# Patient Record
Sex: FEMALE | RURAL
[De-identification: names, ages, dates, MRNs, and addresses within clinical notes are randomized per-mention and may not be internally consistent; named-entity substitution may affect disease eponyms.]

---

## 2021-07-20 ENCOUNTER — HOSPITAL ENCOUNTER (OUTPATIENT)
Facility: HOSPITAL | Age: 86
Discharge: HOME | End: 2021-07-20
Payer: MEDICARE

## 2021-08-05 ENCOUNTER — HOSPITAL ENCOUNTER (OUTPATIENT)
Facility: HOSPITAL | Age: 86
Discharge: HOME | End: 2021-08-05
Payer: MEDICARE

## 2021-08-05 LAB
HCT VFR BLD CALC: 30.7 % (ref 37–47)
HEMOGLOBIN: 10 G/DL (ref 11.5–16.5)
MCH RBC QN AUTO: 32.1 PG (ref 27–32)
MCHC RBC AUTO-ENTMCNC: 32.6 G/DL (ref 31–35)
MCV RBC AUTO: 98.4 FL (ref 80–100)
PDW BLD-RTO: 14.8 % (ref 11–16)
PLATELET # BLD: 211 K/UL (ref 150–400)
PMV BLD AUTO: 10.3 FL (ref 6–10)
RBC # BLD: 3.12 M/UL (ref 3.8–5.8)
WBC # BLD: 7.3 K/UL (ref 4–11)

## 2021-08-05 PROCEDURE — 36415 COLL VENOUS BLD VENIPUNCTURE: CPT

## 2021-08-05 PROCEDURE — 85027 COMPLETE CBC AUTOMATED: CPT

## 2023-04-04 ENCOUNTER — OFFICE VISIT (OUTPATIENT)
Dept: CARDIOLOGY | Facility: CLINIC | Age: 88
End: 2023-04-04
Payer: MEDICARE

## 2023-04-04 VITALS
DIASTOLIC BLOOD PRESSURE: 72 MMHG | SYSTOLIC BLOOD PRESSURE: 138 MMHG | OXYGEN SATURATION: 94 % | WEIGHT: 203 LBS | HEART RATE: 84 BPM | BODY MASS INDEX: 32.62 KG/M2 | HEIGHT: 66 IN

## 2023-04-04 DIAGNOSIS — I10 PRIMARY HYPERTENSION: ICD-10-CM

## 2023-04-04 DIAGNOSIS — G47.36 HYPOXEMIA ASSOCIATED WITH SLEEP: ICD-10-CM

## 2023-04-04 DIAGNOSIS — G47.33 OSA (OBSTRUCTIVE SLEEP APNEA): Primary | ICD-10-CM

## 2023-04-04 PROCEDURE — 1159F MED LIST DOCD IN RCRD: CPT | Performed by: NURSE PRACTITIONER

## 2023-04-04 PROCEDURE — 99214 OFFICE O/P EST MOD 30 MIN: CPT | Performed by: NURSE PRACTITIONER

## 2023-04-04 PROCEDURE — 1160F RVW MEDS BY RX/DR IN RCRD: CPT | Performed by: NURSE PRACTITIONER

## 2023-04-04 RX ORDER — NITROGLYCERIN 0.4 MG/1
TABLET SUBLINGUAL
COMMUNITY

## 2023-04-04 RX ORDER — LEVOTHYROXINE SODIUM 88 UG/1
1 TABLET ORAL DAILY
COMMUNITY
Start: 2023-03-06

## 2023-04-04 RX ORDER — AMIODARONE HYDROCHLORIDE 200 MG/1
0.5 TABLET ORAL DAILY
COMMUNITY

## 2023-04-04 RX ORDER — FLUOXETINE HYDROCHLORIDE 40 MG/1
1 CAPSULE ORAL DAILY
COMMUNITY

## 2023-04-04 RX ORDER — GABAPENTIN 300 MG/1
1 CAPSULE ORAL DAILY
COMMUNITY

## 2023-04-04 RX ORDER — OMEPRAZOLE 40 MG/1
1 CAPSULE, DELAYED RELEASE ORAL DAILY
COMMUNITY

## 2023-04-04 RX ORDER — ALLOPURINOL 300 MG/1
1 TABLET ORAL DAILY
COMMUNITY

## 2023-04-04 RX ORDER — PILOCARPINE HYDROCHLORIDE 5 MG/1
1 TABLET, FILM COATED ORAL 2 TIMES DAILY
COMMUNITY

## 2023-04-04 RX ORDER — IRBESARTAN 300 MG/1
1 TABLET ORAL DAILY
COMMUNITY
Start: 2023-04-03

## 2023-04-04 RX ORDER — BUMETANIDE 2 MG/1
1 TABLET ORAL 2 TIMES DAILY
COMMUNITY

## 2023-04-04 NOTE — ASSESSMENT & PLAN NOTE
Baseline AHI is 33.  This is severe sleep apnea.  She is on CPAP therapy.  Download is reviewed with good control and good compliance.  She had a long hospitalization and rehabilitation stay in February and March 2023.  She did not use her home CPAP during this period of time.  She is benefiting from PAP therapy and we plan to continue PAP therapy.    Supply order to the DME of her choice.  Long discussion on adjusting the humidifier and adjusting the tube temperature for comfort due to dry mouth.  Long discussion on mask choices she is currently in an air fit F30 with some mild skin irritation on her cheeks.  Encouraged to use a face shield with her mask if possible.  She may consider a memory foam as this may benefit her skin irritation from the mask.  We did offer her a nasal mask and a chinstrap and this was discussed.  She is a mouth breather.  She prefers to maintain her fullface mask.

## 2023-04-04 NOTE — ASSESSMENT & PLAN NOTE
Pressure 138/72.  This is well controlled.  Untreated sleep apnea may potentiate hypertension.  She is encouraged to continue her PAP therapy.

## 2023-04-04 NOTE — PROGRESS NOTES
She is    Follow-Up Sleep Consult     Date:   2023  Name: Princess Ramos  :   1932  PCP: Monster Burden PA    Chief Complaint   Patient presents with   • Sleep Apnea       Subjective     History of Present Illness  Princess Ramos is a 89 y.o. female who presents today for follow-up on JESUS MANUEL.  She has a known history of severe sleep apnea.  Accompanied to the office today with her son via wheelchair assistance.  Ms. Ramos is very delightful and carries on conversation easily.  She reports she had a long hospital stay and a rehabilitation stay in the month of February and March due to sepsis.  She reports that she is recovering from this well.  She reports that her mask has some air leak and is a little difficult to seat and seal at times.  She reports that she has some skin irritation due to the mask but her family physician has given her cream which does help with this.  She reports that her airflow is very comfortable.  She reports that her device is functioning well.  Her and her son reports that she received her supplies from the DME and they are very helpful and that in fact her DME has been out to her house to help her adjust her humidifier due to her complaints of dry mouth.  She has dry mouth in the daytime and at the nighttime.  She reports that she is a mouth breather in the daytime and at nighttime.    She has coexisting atrial fibrillation, hypertension.    History of JESUS MANUEL with a baseline AHI of 33 on a study 2014.  Her last titration study was 2014.      Current Treatment: CPAP 9 to 14 cm plus O2 at 2 L nightly.      Device Download  AHI on download is 3.3.  Compliance on download is 93%.  When used >4 hours is 60%  Average use per night is 6 hours 6 minutes.    Current mask used is fullface mask that is an air fit F30.    The patient's relevant past medical, surgical, family, and social history reviewed and updated in Epic as appropriate.    Past Medical History:    Diagnosis Date   • Anemia    • Atrial fibrillation    • Breast cancer    • Cataracts, bilateral    • DVT (deep venous thrombosis)     BLOOD CLOTS IN LEGS   • Gout    • High blood pressure    • Obstructive sleep apnea (adult) (pediatric)    • PAF (paroxysmal atrial fibrillation)    • RLS (restless legs syndrome)    • Sleep related hypoventilation in conditions classified elsewhere    • Stroke (cerebrum)      Past Surgical History:   Procedure Laterality Date   • TOTAL HIP ARTHROPLASTY  2010     OB History    No obstetric history on file.       No Known Allergies  Prior to Admission medications    Medication Sig Start Date End Date Taking? Authorizing Provider   allopurinol (ZYLOPRIM) 300 MG tablet Take 1 tablet by mouth Daily.   Yes Monty Harrison MD   amiodarone (PACERONE) 200 MG tablet Take 0.5 tablets by mouth Daily.   Yes Monty Harrison MD   bumetanide (BUMEX) 2 MG tablet Take 1 tablet by mouth 2 (Two) Times a Day.   Yes Monty Harrison MD   FLUoxetine (PROzac) 40 MG capsule Take 1 capsule by mouth Daily.   Yes Monty Harrison MD   gabapentin (NEURONTIN) 300 MG capsule Take 1 capsule by mouth Daily.   Yes Monty Harrison MD   irbesartan (AVAPRO) 300 MG tablet Take 1 tablet by mouth Daily. 4/3/23  Yes Monty Harrison MD   levothyroxine (SYNTHROID, LEVOTHROID) 88 MCG tablet Take 1 tablet by mouth Daily. 3/6/23  Yes Monty Harrison MD   nitroglycerin (NITROSTAT) 0.4 MG SL tablet nitroglycerin 0.4 mg sublingual tablet   DISSOLVE 1 TABLET UNDER THE TONGUE EVERY 5 MINUTES AS NEEDED FOR CHEST PAIN. DO NOT EXCEED A TOTAL OF 3 DOSES IN 15 MINUTES.   Yes ProviderMonty MD   omeprazole (priLOSEC) 40 MG capsule Take 1 capsule by mouth Daily.   Yes ProviderMonty MD   pilocarpine (SALAGEN) 5 MG tablet Take 1 tablet by mouth 2 (Two) Times a Day.   Yes Monty Harrison MD   rivaroxaban (XARELTO) 15 MG tablet Take 1 tablet by mouth Daily.   Yes Hunter  "MD Monty   vitamin D3 125 MCG (5000 UT) capsule capsule Take 1 capsule by mouth Daily. 4/1/23  Yes Provider, MD Monty     Family History   Problem Relation Age of Onset   • Clotting disorder Mother    • Other Father         WORK ACCIDENT   • Alzheimer's disease Sister    • Heart disease Brother    • No Known Problems Brother    • Other Brother         SEPSIS       Objective     Vital Signs:  /72 (BP Location: Left arm, Patient Position: Sitting)   Pulse 84   Ht 167.6 cm (66\")   Wt 92.1 kg (203 lb)   SpO2 94%   BMI 32.77 kg/m²     BMI is >= 30 and <35. (Class 1 Obesity). The following options were offered after discussion;: referral to primary care        Physical Exam  Constitutional:       Appearance: Normal appearance.   HENT:      Head: Normocephalic.      Nose: Nose normal.      Mouth/Throat:      Mouth: Mucous membranes are dry.   Pulmonary:      Effort: Pulmonary effort is normal.   Musculoskeletal:         General: Normal range of motion.      Cervical back: Neck supple.   Neurological:      Mental Status: She is alert and oriented to person, place, and time.   Psychiatric:         Mood and Affect: Mood normal.         Behavior: Behavior normal.         Thought Content: Thought content normal.         The following data was reviewed by: JOSEFINA Villarreal on 04/04/2023:    PAP download reviewed: CPAP download dated January 2 through January 31, 2023.  This is a 30-day download.  Download is reviewed and interpreted in the clinic today.         Assessment and Plan     Diagnoses and all orders for this visit:    1. JESUS MANUEL (obstructive sleep apnea) (Primary)  Assessment & Plan:  Baseline AHI is 33.  This is severe sleep apnea.  She is on CPAP therapy.  Download is reviewed with good control and good compliance.  She had a long hospitalization and rehabilitation stay in February and March 2023.  She did not use her home CPAP during this period of time.  She is benefiting from PAP " therapy and we plan to continue PAP therapy.    Supply order to the DME of her choice.  Long discussion on adjusting the humidifier and adjusting the tube temperature for comfort due to dry mouth.  Long discussion on mask choices she is currently in an air fit F30 with some mild skin irritation on her cheeks.  Encouraged to use a face shield with her mask if possible.  She may consider a memory foam as this may benefit her skin irritation from the mask.  We did offer her a nasal mask and a chinstrap and this was discussed.  She is a mouth breather.  She prefers to maintain her fullface mask.    Orders:  -     PAP Therapy    2. Hypoxemia associated with sleep  Assessment & Plan:  She has O2 at 2 L connected to her CPAP device.      3. Primary hypertension  Assessment & Plan:  Pressure 138/72.  This is well controlled.  Untreated sleep apnea may potentiate hypertension.  She is encouraged to continue her PAP therapy.          Report if any new/changing symptoms immediately and Increase pap therapy usage         Follow Up  Return in about 1 year (around 4/4/2024) for JESUS MANUEL.  Patient was given instructions and counseling regarding her condition or for health maintenance advice. Please see specific information pulled into the AVS if appropriate.

## 2023-08-25 DIAGNOSIS — G89.4 CHRONIC PAIN SYNDROME: Primary | ICD-10-CM

## 2023-08-25 PROCEDURE — 99305 1ST NF CARE MODERATE MDM 35: CPT | Performed by: FAMILY MEDICINE

## 2023-08-25 RX ORDER — GABAPENTIN 300 MG/1
300 CAPSULE ORAL NIGHTLY
Qty: 30 CAPSULE | Refills: 1 | Status: SHIPPED | OUTPATIENT
Start: 2023-08-25 | End: 2023-10-24

## 2023-08-28 DIAGNOSIS — G89.4 CHRONIC PAIN SYNDROME: ICD-10-CM

## 2023-08-28 RX ORDER — GABAPENTIN 300 MG/1
300 CAPSULE ORAL 2 TIMES DAILY
Qty: 60 CAPSULE | Refills: 0 | Status: SHIPPED | OUTPATIENT
Start: 2023-08-28 | End: 2023-09-27

## 2023-08-29 DIAGNOSIS — F41.9 ANXIETY: Primary | ICD-10-CM

## 2023-08-29 RX ORDER — LORAZEPAM 0.5 MG/1
0.5 TABLET ORAL EVERY 8 HOURS PRN
Qty: 90 TABLET | Refills: 0 | Status: SHIPPED | OUTPATIENT
Start: 2023-08-29 | End: 2023-09-28

## 2023-09-11 ENCOUNTER — OUTSIDE SERVICES (OUTPATIENT)
Dept: FAMILY MEDICINE CLINIC | Age: 88
End: 2023-09-11
Payer: MEDICARE

## 2023-09-11 DIAGNOSIS — L03.116 CELLULITIS OF LEFT LOWER EXTREMITY: ICD-10-CM

## 2023-09-11 DIAGNOSIS — N28.9 ACUTE RENAL INSUFFICIENCY: ICD-10-CM

## 2023-09-11 DIAGNOSIS — D72.829 LEUKOCYTOSIS, UNSPECIFIED TYPE: Primary | ICD-10-CM

## 2023-09-11 DIAGNOSIS — D64.9 ANEMIA, UNSPECIFIED TYPE: ICD-10-CM

## 2023-09-11 DIAGNOSIS — J81.0 ACUTE PULMONARY EDEMA (HCC): ICD-10-CM

## 2023-09-11 DIAGNOSIS — I48.0 PAROXYSMAL ATRIAL FIBRILLATION (HCC): ICD-10-CM

## 2023-09-11 ASSESSMENT — ENCOUNTER SYMPTOMS
BACK PAIN: 1
SHORTNESS OF BREATH: 1

## 2023-09-11 NOTE — PROGRESS NOTES
1. Leukocytosis, unspecified type        2. Acute renal insufficiency        3. Anemia, unspecified type        4. Paroxysmal atrial fibrillation (HCC)        5. Acute pulmonary edema (HCC)        6. Cellulitis of left lower extremity            No orders of the defined types were placed in this encounter. There are no discontinued medications. Controlled Substances Monitoring:      Please note: This chart was generated using Dragon dictation software. Although every effort was made to ensure the accuracy of this automated transcription, some errors in transcription may have occurred.

## 2023-09-22 RX ORDER — FLUTICASONE PROPIONATE AND SALMETEROL 100; 50 UG/1; UG/1
POWDER RESPIRATORY (INHALATION)
Qty: 60 EACH | OUTPATIENT
Start: 2023-09-22